# Patient Record
Sex: MALE | Race: BLACK OR AFRICAN AMERICAN | Employment: UNEMPLOYED
[De-identification: names, ages, dates, MRNs, and addresses within clinical notes are randomized per-mention and may not be internally consistent; named-entity substitution may affect disease eponyms.]

---
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
## 2022-05-16 ENCOUNTER — HOSPITAL ENCOUNTER (EMERGENCY)
Age: 22
Discharge: HOME | End: 2022-05-16
Attending: EMERGENCY MEDICINE
Payer: COMMERCIAL

## 2022-05-16 ENCOUNTER — APPOINTMENT (OUTPATIENT)
Dept: GENERAL RADIOLOGY | Age: 22
End: 2022-05-16
Payer: COMMERCIAL

## 2022-05-16 VITALS
TEMPERATURE: 98.2 F | DIASTOLIC BLOOD PRESSURE: 83 MMHG | RESPIRATION RATE: 16 BRPM | OXYGEN SATURATION: 97 % | HEART RATE: 86 BPM | SYSTOLIC BLOOD PRESSURE: 130 MMHG

## 2022-05-16 DIAGNOSIS — R06.02 SHORTNESS OF BREATH: Primary | ICD-10-CM

## 2022-05-16 DIAGNOSIS — J45.20 MILD INTERMITTENT ASTHMA WITHOUT COMPLICATION: ICD-10-CM

## 2022-05-16 PROCEDURE — 99285 EMERGENCY DEPT VISIT HI MDM: CPT

## 2022-05-16 PROCEDURE — 94664 DEMO&/EVAL PT USE INHALER: CPT

## 2022-05-16 PROCEDURE — 6370000000 HC RX 637 (ALT 250 FOR IP): Performed by: STUDENT IN AN ORGANIZED HEALTH CARE EDUCATION/TRAINING PROGRAM

## 2022-05-16 PROCEDURE — 94640 AIRWAY INHALATION TREATMENT: CPT

## 2022-05-16 PROCEDURE — 93005 ELECTROCARDIOGRAM TRACING: CPT

## 2022-05-16 PROCEDURE — U0005 INFEC AGEN DETEC AMPLI PROBE: HCPCS

## 2022-05-16 PROCEDURE — 71046 X-RAY EXAM CHEST 2 VIEWS: CPT

## 2022-05-16 PROCEDURE — U0003 INFECTIOUS AGENT DETECTION BY NUCLEIC ACID (DNA OR RNA); SEVERE ACUTE RESPIRATORY SYNDROME CORONAVIRUS 2 (SARS-COV-2) (CORONAVIRUS DISEASE [COVID-19]), AMPLIFIED PROBE TECHNIQUE, MAKING USE OF HIGH THROUGHPUT TECHNOLOGIES AS DESCRIBED BY CMS-2020-01-R: HCPCS

## 2022-05-16 RX ORDER — IPRATROPIUM BROMIDE AND ALBUTEROL SULFATE 2.5; .5 MG/3ML; MG/3ML
1 SOLUTION RESPIRATORY (INHALATION) EVERY 4 HOURS PRN
Status: DISCONTINUED | OUTPATIENT
Start: 2022-05-16 | End: 2022-05-16 | Stop reason: HOSPADM

## 2022-05-16 RX ORDER — PREDNISONE 20 MG/1
20 TABLET ORAL 2 TIMES DAILY
Qty: 10 TABLET | Refills: 0 | Status: SHIPPED | OUTPATIENT
Start: 2022-05-16 | End: 2022-05-21

## 2022-05-16 RX ORDER — PREDNISONE 20 MG/1
40 TABLET ORAL ONCE
Status: COMPLETED | OUTPATIENT
Start: 2022-05-16 | End: 2022-05-16

## 2022-05-16 RX ORDER — ALBUTEROL SULFATE 90 UG/1
2 AEROSOL, METERED RESPIRATORY (INHALATION) 4 TIMES DAILY PRN
Qty: 54 G | Refills: 0 | Status: SHIPPED | OUTPATIENT
Start: 2022-05-16 | End: 2022-08-25

## 2022-05-16 RX ORDER — ALBUTEROL SULFATE 90 UG/1
2 AEROSOL, METERED RESPIRATORY (INHALATION) EVERY 6 HOURS PRN
Status: DISCONTINUED | OUTPATIENT
Start: 2022-05-16 | End: 2022-05-16 | Stop reason: HOSPADM

## 2022-05-16 RX ADMIN — ALBUTEROL SULFATE 2 PUFF: 90 AEROSOL, METERED RESPIRATORY (INHALATION) at 14:41

## 2022-05-16 RX ADMIN — PREDNISONE 40 MG: 20 TABLET ORAL at 14:38

## 2022-05-16 RX ADMIN — IPRATROPIUM BROMIDE AND ALBUTEROL SULFATE 1 AMPULE: .5; 2.5 SOLUTION RESPIRATORY (INHALATION) at 14:22

## 2022-05-16 ASSESSMENT — ENCOUNTER SYMPTOMS
VOMITING: 0
NAUSEA: 0
SHORTNESS OF BREATH: 1
COUGH: 1
ABDOMINAL PAIN: 0
BACK PAIN: 0

## 2022-05-16 NOTE — Clinical Note
Heladio Hill was seen and treated in our emergency department on 5/16/2022. He may return to work on 05/17/2022. If you have any questions or concerns, please don't hesitate to call.       Constantine Hanks MD

## 2022-05-16 NOTE — ED NOTES
The following labs labeled with pt sticker and tubed to lab:     [] Blue     [] Romana Robins   [] on ice  [] Green/yellow  [] Green/black [] on ice  [] Yellow  [] Red  [] Pink      [x] COVID-19 swab    [] Rapid  [x] PCR  [] Flu swab  [] Peds Viral Panel     [] Urine Sample  [] Pelvic Cultures  [] Blood Cultures          Kiko Paiz LPN  33/69/03 [id number]

## 2022-05-16 NOTE — ED PROVIDER NOTES
[address] [facility]     Emergency Department     Faculty Attestation    I performed a history and physical examination of the patient and discussed management with the resident. I reviewed the residents note and agree with the documented findings including all diagnostic interpretations and plan of care. Any areas of disagreement are noted on the chart. I was personally present for the key portions of any procedures. I have documented in the chart those procedures where I was not present during the key portions. I have reviewed the emergency nurses triage note. I agree with the chief complaint, past medical history, past surgical history, allergies, medications, social and family history as documented unless otherwise noted below. Documentation of the HPI, Physical Exam and Medical Decision Making performed by scriblou is based on my personal performance of the HPI, PE and MDM. For Physician Assistant/ Nurse Practitioner cases/documentation I have personally evaluated this patient and have completed at least one if not all key elements of the E/M (history, physical exam, and MDM). Additional findings are as noted. This patient was evaluated in the Emergency Department for symptoms described in the history of present illness. He/she was evaluated in the context of the global COVID-19 pandemic, which necessitated consideration that the patient might be at risk for infection with the SARS-CoV-2 virus that causes COVID-19. Institutional protocols and algorithms that pertain to the evaluation of patients at risk for COVID-19 are in a state of rapid change based on information released by regulatory bodies including the CDC and federal and state organizations. These policies and algorithms were followed during the patient's care in the ED. Primary Care Physician: No primary care provider on file. History:  This is a 25 y.o. male who presents to the Emergency Department with complaint of shortness of breath. History of asthma. No fevers mild cough. Patient reports that he ran out of his typical inhaler. Physical:     oral temperature is 98.2 °F (36.8 °C). His blood pressure is 130/83 and his pulse is 86. His respiration is 16 and oxygen saturation is 97%.    25 y.o. male acute distress, taking breathing treatments at this time, lungs clear bilaterally, cardiac exam regular rate and rhythm no murmurs rubs gallops, pulmonary clear bilaterally abdomen soft nontender nondistended.     Impression: Asthma exacerbation    Plan: Chest x-ray, COVID swab, aerosols, steroid    EKG Interpretation    Interpreted by me    Rhythm: normal sinus   Rate: normal  Axis: normal  Ectopy: none  Conduction: normal  ST Segments: no acute change  T Waves: no acute change  Q Waves: none    Clinical Impression: no acute changes and normal EKG      Pam Neumann MD, Abiel Scott  Attending Emergency Physician         Wendy Betts MD  05/16/22 [id number]

## 2022-05-16 NOTE — ED PROVIDER NOTES
[facility] ED  Emergency Department Encounter  Emergency Medicine Resident     Pt Name: Ad Patterson  MRN: [id number]  [place] 2000  Date of evaluation: 5/16/22  PCP:  No primary care provider on file. CHIEF COMPLAINT       Chief Complaint   Patient presents with    Shortness of Breath     states hx asthma, out of inhaler, does not have pcp       HISTORY OFPRESENT ILLNESS  (Location/Symptom, Timing/Onset, Context/Setting, Quality, Duration, Modifying Factors,Severity.)      Ad Patterson is a 25 y. o.yo male who presents with shortness of breath with history of asthma. States that he has been feeling intermittent feelings of shortness of breath with intermittent cough. States he ran out of his inhaler has have history of asthma. States he has not noticed wheezing has not noticed any upper respiratory infection symptoms such as rhinorrhea or sore throat. States he feels well is able to run but does feel like he is getting winded more often. Denies abdominal pain nausea or vomiting. Denies headache vision changes or focal deficits. States no to chest pain though he notes that does exist intermittently. States that this time he is completely pain-free. PAST MEDICAL / SURGICAL / SOCIAL / FAMILY HISTORY      has a past medical history of Allergic rhinitis, Allergic rhinitis, and Moderate persistent asthma. has a past surgical history that includes Circumcision.      Social History     Socioeconomic History    Marital status: Single     Spouse name: Not on file    Number of children: Not on file    Years of education: Not on file    Highest education level: Not on file   Occupational History    Not on file   Tobacco Use    Smoking status: Passive Smoke Exposure - Never Smoker    Smokeless tobacco: Never Used    Tobacco comment: dad smokes outside when pt visits   Vaping Use    Vaping Use: Never used   Substance and Sexual Activity    Alcohol use: No    Drug use: No    Sexual activity: Never   Other Topics Concern    Not on file   Social History Narrative    Not on file     Social Determinants of Health     Financial Resource Strain:     Difficulty of Paying Living Expenses: Not on file   Food Insecurity:     Worried About Running Out of Food in the Last Year: Not on file    Leonarda of Food in the Last Year: Not on file   Transportation Needs:     Lack of Transportation (Medical): Not on file    Lack of Transportation (Non-Medical):  Not on file   Physical Activity:     Days of Exercise per Week: Not on file    Minutes of Exercise per Session: Not on file   Stress:     Feeling of Stress : Not on file   Social Connections:     Frequency of Communication with Friends and Family: Not on file    Frequency of Social Gatherings with Friends and Family: Not on file    Attends Protestant Services: Not on file    Active Member of [address] [place] [address] [employer] or Organizations: Not on file    Attends Club or Organization Meetings: Not on file    Marital Status: Not on file   Intimate Partner Violence:     Fear of Current or Ex-Partner: Not on file    Emotionally Abused: Not on file    Physically Abused: Not on file    Sexually Abused: Not on file   Housing Stability:     Unable to Pay for Housing in the Last Year: Not on file    Number of [place] in the Last Year: Not on file    Unstable Housing in the Last Year: Not on file       Family History   Problem Relation Age of Onset    Depression Mother     Miscarriages / [place] Mother     Asthma Maternal Aunt     Asthma Maternal Uncle     High Blood Pressure Maternal Uncle     Heart Disease Maternal Grandmother     High Blood Pressure Maternal Grandmother     High Blood Pressure Maternal Grandfather     Diabetes Paternal Grandmother     Asthma Maternal Aunt     Depression Maternal Aunt     Arthritis Neg Hx     Birth Defects Neg Hx     Cancer Neg Hx     Early Death Neg Hx     Hearing Loss Neg Hx     High Cholesterol Neg Hx     Kidney Disease Neg Hx     Learning Disabilities Neg Hx     Mental Illness Neg Hx     Mental Retardation Neg Hx     Stroke Neg Hx     Substance Abuse Neg Hx     Vision Loss Neg Hx         Allergies:  Patient has no known allergies. Home Medications:  Prior to Admission medications    Medication Sig Start Date End Date Taking? Authorizing Provider   predniSONE (DELTASONE) 20 MG tablet Take 1 tablet by mouth 2 times daily for 5 days 5/16/22 5/21/22 Yes Victoria Bejarano MD   albuterol sulfate HFA (VENTOLIN HFA) 108 (90 Base) MCG/ACT inhaler Inhale 2 puffs into the lungs 4 times daily as needed for Wheezing 5/16/22 8/25/22 Yes Victoria Bejarano MD   ibuprofen (ADVIL;MOTRIN) 600 MG tablet Take 1 tablet by mouth 4 times daily as needed for Pain 8/10/20   Anali Perez MD   montelukast (SINGULAIR) 10 MG tablet Take 1 tablet by mouth nightly  Patient not taking: Reported on 8/10/2020 6/25/18   Kartik Soto MD   fluticasone [facility] [place] [place]) 50 MCG/ACT nasal spray 2 sprays by Nasal route daily  Patient not taking: Reported on 8/10/2020 6/25/18   Kartik Soto MD   fluticasone (FLOVENT HFA) 110 MCG/ACT inhaler Inhale 2 puffs into the lungs 2 times daily 6/25/18 6/25/19  Kartik Soto MD   magnesium oxide (MAG-OX) 400 MG tablet Take 1 tablet by mouth daily  Patient not taking: Reported on 8/10/2020 5/2/17   Susan Dobbs MD   Coenzyme Q-10 100 MG CAPS Take 300 mg by mouth every morning  Patient not taking: Reported on 8/10/2020 5/2/17   Susan Dobbs MD   cyproheptadine (PERIACTIN) 4 MG tablet Take 1 tablet by mouth every evening  Patient not taking: Reported on 8/10/2020 5/2/17   Susan Dobbs MD   ibuprofen (ADVIL;MOTRIN) 200 MG tablet Take 2 tablets by mouth every 8 hours as needed for Pain or Fever    Gerri Hernandez MD   beclomethasone (QVAR) 80 MCG/ACT inhaler Inhale 2 puffs into the lungs 2 times daily.   Patient not taking: Reported on 8/10/2020 4/22/15   Polly Giles APRN - CNP   lansoprazole (PREVACID) 15 MG capsule Take 1 capsule by mouth daily. 4/18/13 4/18/14  Autumn Patel MD   Respiratory Therapy Supplies ([employer] HOLDING CHAMBER/MASK) BARAK by Does not apply route. 2/22/12   Paulina Israel MD       REVIEW OFSYSTEMS    (2-9 systems for level 4, 10 or more for level 5)      Review of Systems   Constitutional: Negative for diaphoresis and fever. HENT: Negative for congestion. Eyes: Negative for visual disturbance. Respiratory: Positive for cough and shortness of breath. Cardiovascular: Negative for chest pain. Gastrointestinal: Negative for abdominal pain, nausea and vomiting. Endocrine: Negative for polyuria. Genitourinary: Negative for dysuria. Musculoskeletal: Negative for back pain. Skin: Negative for wound. Neurological: Negative for headaches. Psychiatric/Behavioral: Negative for confusion. PHYSICAL EXAM   (up to 7 for level 4, 8 or more forlevel 5)      ED TRIAGE VITALS BP: 130/83, Temp: 98.2 °F (36.8 °C), Heart Rate: 86, Resp: 16, SpO2: 97 %    Vitals:    05/16/22 1326   BP: 130/83   Pulse: 86   Resp: 16   Temp: 98.2 °F (36.8 °C)   TempSrc: Oral   SpO2: 97%       Physical Exam  Constitutional:       General: He is not in acute distress. Appearance: He is well-developed. HENT:      Head: Normocephalic and atraumatic. Nose: Nose normal.   Eyes:      Pupils: Pupils are equal, round, and reactive to light. Cardiovascular:      Rate and Rhythm: Normal rate and regular rhythm. Heart sounds: No murmur heard. Pulmonary:      Effort: Pulmonary effort is normal. No respiratory distress. Breath sounds: No stridor. Examination of the right-lower field reveals decreased breath sounds. Examination of the left-lower field reveals decreased breath sounds. Decreased breath sounds present. No wheezing. Abdominal:      General: There is no distension. Palpations: Abdomen is soft. Tenderness:  There is no abdominal tenderness. Musculoskeletal:         General: No tenderness. Normal range of motion. Cervical back: Normal range of motion and neck supple. Skin:     General: Skin is warm and dry. Capillary Refill: Capillary refill takes less than 2 seconds. Findings: No erythema or rash. Neurological:      Mental Status: He is alert and oriented to person, place, and time. Sensory: No sensory deficit. Deep Tendon Reflexes: Reflexes normal.   Psychiatric:         Behavior: Behavior normal.         DIFFERENTIAL  DIAGNOSIS     PLAN (LABS / IMAGING / EKG):  Orders Placed This Encounter   Procedures    XR CHEST (2 VW)    SARS-CoV-2 RT-PCR    Respiratory care evaluation only    EKG 12 Lead       MEDICATIONS ORDERED:  Orders Placed This Encounter   Medications    albuterol sulfate  (90 Base) MCG/ACT inhaler 2 puff     Order Specific Question:   Initiate RT Bronchodilator Protocol     Answer: Yes    ipratropium-albuterol (DUONEB) nebulizer solution 1 ampule     Order Specific Question:   Initiate RT Bronchodilator Protocol     Answer:    Yes    predniSONE (DELTASONE) tablet 40 mg    predniSONE (DELTASONE) 20 MG tablet     Sig: Take 1 tablet by mouth 2 times daily for 5 days     Dispense:  10 tablet     Refill:  0    albuterol sulfate HFA (VENTOLIN HFA) 108 (90 Base) MCG/ACT inhaler     Sig: Inhale 2 puffs into the lungs 4 times daily as needed for Wheezing     Dispense:  54 g     Refill:  0       DDX:     Arrhythmia, asthma exacerbation, pneumonia, COVID-19    Initial MDM/Plan: 25 y.o. male who presents with shortness of breath    Here with intermittent shortness of breath does have history of asthma, mild and intermittent cough  No chest pain on assessment  Diminished bilateral air movement  No wheezing or rhonchi or rales  X-ray negative for pneumonia  COVID-19 PCR ordered  RT evaluation and was given nebulized treatment with albuterol and the airway did open up  Patient feels much better states he is breathing better  Plan for outpatient follow-up  Steroids    Disposition:  Discharged with outpatient follow, strict return precautions, albuterol and steroids        DIAGNOSTIC RESULTS / EMERGENCYDEPARTMENT COURSE / MDM     LABS:  No results found for this visit on 05/16/22. RADIOLOGY:  XR CHEST (2 VW)   Final Result   Negative PA and lateral chest.             EKG  EKG Interpretation    Interpreted by me    Rhythm: normal sinus   Rate: normal  Axis: normal  Ectopy: none  Conduction: normal  ST Segments: no acute change  T Waves: no acute change  Q Waves: none    Clinical Impression: no acute changes and normal EKG    All EKG's are interpreted by the Emergency Department Physicianwho either signs or Co-signs this chart in the absence of a cardiologist.    EMERGENCY DEPARTMENT COURSE:  ED Course as of 05/16/22 1522   Mon May 16, 2022   1406 XR CHEST (2 VW)  Negative   [PS]      ED Course User Index  [PS] Ximena Acosta MD          PROCEDURES:  None    CONSULTS:  None    CRITICAL CARE:  Please see attending note    FINAL IMPRESSION      1. Shortness of breath    2.  Mild intermittent asthma without complication          DISPOSITION / PLAN     DISPOSITION Decision To Discharge 05/16/2022 02:34:54 PM       PATIENT REFERRED TO:  [facility]  [address] [place] [address]  [phone]    As needed, If symptoms worsen    [facility]  [address] [place] [address]  [phone]  In 1 week        DISCHARGE MEDICATIONS:  Discharge Medication List as of 5/16/2022  2:43 PM      START taking these medications    Details   predniSONE (DELTASONE) 20 MG tablet Take 1 tablet by mouth 2 times daily for 5 days, Disp-10 tablet, R-0Print             Ximena Acosta MD  Emergency Medicine Resident    (Please note that portions of this note were completed with a voice recognition program.Efforts were made

## 2022-05-17 LAB
SARS-COV-2: NORMAL
SARS-COV-2: NOT DETECTED
SOURCE: NORMAL

## 2022-05-20 LAB
EKG ATRIAL RATE: 73 BPM
EKG P AXIS: 27 DEGREES
EKG P-R INTERVAL: 148 MS
EKG Q-T INTERVAL: 384 MS
EKG QRS DURATION: 94 MS
EKG QTC CALCULATION (BAZETT): 423 MS
EKG R AXIS: 52 DEGREES
EKG T AXIS: 46 DEGREES
EKG VENTRICULAR RATE: 73 BPM

## 2022-06-11 ENCOUNTER — APPOINTMENT (OUTPATIENT)
Dept: CT IMAGING | Age: 22
End: 2022-06-11
Payer: COMMERCIAL

## 2022-06-11 ENCOUNTER — HOSPITAL ENCOUNTER (EMERGENCY)
Age: 22
Discharge: HOME | End: 2022-06-11
Attending: EMERGENCY MEDICINE
Payer: COMMERCIAL

## 2022-06-11 VITALS
DIASTOLIC BLOOD PRESSURE: 87 MMHG | TEMPERATURE: 98.5 F | WEIGHT: 229 LBS | SYSTOLIC BLOOD PRESSURE: 143 MMHG | OXYGEN SATURATION: 98 % | RESPIRATION RATE: 15 BRPM | HEART RATE: 93 BPM

## 2022-06-11 DIAGNOSIS — S06.0X1A CONCUSSION WITH LOSS OF CONSCIOUSNESS OF 30 MINUTES OR LESS, INITIAL ENCOUNTER: ICD-10-CM

## 2022-06-11 DIAGNOSIS — S09.90XA CLOSED HEAD INJURY, INITIAL ENCOUNTER: Primary | ICD-10-CM

## 2022-06-11 PROCEDURE — 6370000000 HC RX 637 (ALT 250 FOR IP): Performed by: STUDENT IN AN ORGANIZED HEALTH CARE EDUCATION/TRAINING PROGRAM

## 2022-06-11 PROCEDURE — 93005 ELECTROCARDIOGRAM TRACING: CPT | Performed by: STUDENT IN AN ORGANIZED HEALTH CARE EDUCATION/TRAINING PROGRAM

## 2022-06-11 PROCEDURE — 99284 EMERGENCY DEPT VISIT MOD MDM: CPT

## 2022-06-11 PROCEDURE — 70450 CT HEAD/BRAIN W/O DYE: CPT

## 2022-06-11 PROCEDURE — 72125 CT NECK SPINE W/O DYE: CPT

## 2022-06-11 RX ORDER — ACETAMINOPHEN 500 MG
1000 TABLET ORAL ONCE
Status: COMPLETED | OUTPATIENT
Start: 2022-06-11 | End: 2022-06-11

## 2022-06-11 RX ORDER — MECLIZINE HCL 12.5 MG/1
25 TABLET ORAL ONCE
Status: COMPLETED | OUTPATIENT
Start: 2022-06-11 | End: 2022-06-11

## 2022-06-11 RX ADMIN — MECLIZINE 25 MG: 12.5 TABLET ORAL at 20:53

## 2022-06-11 RX ADMIN — ACETAMINOPHEN 1000 MG: 500 TABLET ORAL at 22:32

## 2022-06-11 ASSESSMENT — ENCOUNTER SYMPTOMS
BACK PAIN: 0
SHORTNESS OF BREATH: 0
VOMITING: 0
ABDOMINAL PAIN: 0
NAUSEA: 0

## 2022-06-11 ASSESSMENT — PAIN - FUNCTIONAL ASSESSMENT: PAIN_FUNCTIONAL_ASSESSMENT: NONE - DENIES PAIN

## 2022-06-11 ASSESSMENT — PAIN SCALES - GENERAL: PAINLEVEL_OUTOF10: 8
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
## 2022-06-12 NOTE — ED NOTES
Dr. Aracely Schuster at bedside explained D/C instructions to mom.       Quinton Thacker RN  06/11/22 1561
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
## 2022-06-12 NOTE — ED NOTES
Pt. Resting in cot, RR even and non-labored, NAD noted, family at bedside, call light within reach      Neema Juárez RN  06/11/22 [id number]

## 2022-06-12 NOTE — ED PROVIDER NOTES
[facility] ED  Emergency Department Encounter  Emergency Medicine Resident     Pt Name: Ansley Wood  MRN: [id number]  [place] 2000  Date of evaluation: 6/11/22  PCP:  No primary care provider on file. CHIEF COMPLAINT       Chief Complaint   Patient presents with    Loss of Consciousness     hit head during football        HISTORY OFPRESENT ILLNESS  (Location/Symptom, Timing/Onset, Context/Setting, Quality, Duration, Modifying Factors,Severity.)      Ansley Wood is a 25 y. o.yo male who presents with head injury. Patient here after head injury playing football patient placed tied in position states that he was tackled does not remember exactly what happened he states he lost consciousness felt nauseous after he was able to walk off the field. Denies any other traumatic injuries states he has a headache his body hurts all over. States he cannot remember what initial start of the plate was. Denies shortness of breath. Denies any other history of concussions. Denies focal deficits, changes in vision, abdominal pain, back pain neck pain. States the headache is 5 out of 10 in severity, nonradiating. Patient is not on any anticoagulation. PAST MEDICAL / SURGICAL / SOCIAL / FAMILY HISTORY      has a past medical history of Allergic rhinitis, Allergic rhinitis, and Moderate persistent asthma. has a past surgical history that includes Circumcision.      Social History     Socioeconomic History    Marital status: Single     Spouse name: Not on file    Number of children: Not on file    Years of education: Not on file    Highest education level: Not on file   Occupational History    Not on file   Tobacco Use    Smoking status: Passive Smoke Exposure - Never Smoker    Smokeless tobacco: Never Used    Tobacco comment: dad smokes outside when pt visits   Vaping Use    Vaping Use: Never used   Substance and Sexual Activity    Alcohol use: No    Drug use: No    Sexual activity: Never   Other Topics Concern    Not on file   Social History Narrative    Not on file     Social Determinants of Health     Financial Resource Strain:     Difficulty of Paying Living Expenses: Not on file   Food Insecurity:     Worried About Running Out of Food in the Last Year: Not on file    Leonarda of Food in the Last Year: Not on file   Transportation Needs:     Lack of Transportation (Medical): Not on file    Lack of Transportation (Non-Medical):  Not on file   Physical Activity:     Days of Exercise per Week: Not on file    Minutes of Exercise per Session: Not on file   Stress:     Feeling of Stress : Not on file   Social Connections:     Frequency of Communication with Friends and Family: Not on file    Frequency of Social Gatherings with Friends and Family: Not on file    Attends Scientologist Services: Not on file    Active Member of [address] [place] [address] [employer] or Organizations: Not on file    Attends Club or Organization Meetings: Not on file    Marital Status: Not on file   Intimate Partner Violence:     Fear of Current or Ex-Partner: Not on file    Emotionally Abused: Not on file    Physically Abused: Not on file    Sexually Abused: Not on file   Housing Stability:     Unable to Pay for Housing in the Last Year: Not on file    Number of [place] in the Last Year: Not on file    Unstable Housing in the Last Year: Not on file       Family History   Problem Relation Age of Onset    Depression Mother     Miscarriages / [place] Mother     Asthma Maternal Aunt     Asthma Maternal Uncle     High Blood Pressure Maternal Uncle     Heart Disease Maternal Grandmother     High Blood Pressure Maternal Grandmother     High Blood Pressure Maternal Grandfather     Diabetes Paternal Grandmother     Asthma Maternal Aunt     Depression Maternal Aunt     Arthritis Neg Hx     Birth Defects Neg Hx     Cancer Neg Hx     Early Death Neg Hx     Hearing Loss Neg Hx     High Cholesterol Neg Hx     Kidney Disease Neg Hx     Learning Disabilities Neg Hx     Mental Illness Neg Hx     Mental Retardation Neg Hx     Stroke Neg Hx     Substance Abuse Neg Hx     Vision Loss Neg Hx         Allergies:  Patient has no known allergies. Home Medications:  Prior to Admission medications    Medication Sig Start Date End Date Taking? Authorizing Provider   albuterol sulfate HFA (VENTOLIN HFA) 108 (90 Base) MCG/ACT inhaler Inhale 2 puffs into the lungs 4 times daily as needed for Wheezing 5/16/22 8/25/22  Sabiha Kaufman MD   ibuprofen (ADVIL;MOTRIN) 600 MG tablet Take 1 tablet by mouth 4 times daily as needed for Pain 8/10/20   Anali Perez MD   montelukast (SINGULAIR) 10 MG tablet Take 1 tablet by mouth nightly  Patient not taking: Reported on 8/10/2020 6/25/18   Dang De León MD   fluticasone Dorean Everts) 50 MCG/ACT nasal spray 2 sprays by Nasal route daily  Patient not taking: Reported on 8/10/2020 6/25/18   Dang De León MD   fluticasone (FLOVENT HFA) 110 MCG/ACT inhaler Inhale 2 puffs into the lungs 2 times daily 6/25/18 6/25/19  Dang De León MD   magnesium oxide (MAG-OX) 400 MG tablet Take 1 tablet by mouth daily  Patient not taking: Reported on 8/10/2020 5/2/17   Dev Worley MD   Coenzyme Q-10 100 MG CAPS Take 300 mg by mouth every morning  Patient not taking: Reported on 8/10/2020 5/2/17   Dev Worley MD   cyproheptadine (PERIACTIN) 4 MG tablet Take 1 tablet by mouth every evening  Patient not taking: Reported on 8/10/2020 5/2/17   Dev Worley MD   ibuprofen (ADVIL;MOTRIN) 200 MG tablet Take 2 tablets by mouth every 8 hours as needed for Pain or Fever    Gerri Crowell MD   beclomethasone (QVAR) 80 MCG/ACT inhaler Inhale 2 puffs into the lungs 2 times daily. Patient not taking: Reported on 8/10/2020 4/22/15   KELVIN Ruelas - CNP   lansoprazole (PREVACID) 15 MG capsule Take 1 capsule by mouth daily.  4/18/13 4/18/14  Dang De León MD   Respiratory Therapy Supplies ([employer]AMBER/MASK) BARAK by Does not apply route. 2/22/12   Gilson Palencia MD       REVIEW OFSYSTEMS    (2-9 systems for level 4, 10 or more for level 5)      Review of Systems   Constitutional: Negative for diaphoresis and fever. HENT: Negative for congestion. Eyes: Negative for visual disturbance. Respiratory: Negative for shortness of breath. Cardiovascular: Negative for chest pain. Gastrointestinal: Negative for abdominal pain, nausea and vomiting. Endocrine: Negative for polyuria. Genitourinary: Negative for dysuria. Musculoskeletal: Negative for back pain. Skin: Negative for wound. Neurological: Positive for headaches. Psychiatric/Behavioral: Negative for confusion. PHYSICAL EXAM   (up to 7 for level 4, 8 or more forlevel 5)      ED TRIAGE VITALS BP: (!) 143/87, Temp: 98.5 °F (36.9 °C), Heart Rate: 93, Resp: 15, SpO2: 98 %    Vitals:    06/11/22 2108   BP: (!) 143/87   Pulse: 93   Resp: 15   Temp: 98.5 °F (36.9 °C)   TempSrc: Oral   SpO2: 98%   Weight: 229 lb (103.9 kg)       Physical Exam  Constitutional:       General: He is not in acute distress. Appearance: He is well-developed. HENT:      Head: Normocephalic and atraumatic. Nose: Nose normal.   Eyes:      Pupils: Pupils are equal, round, and reactive to light. Cardiovascular:      Rate and Rhythm: Normal rate and regular rhythm. Heart sounds: No murmur heard. Pulmonary:      Effort: Pulmonary effort is normal. No respiratory distress. Breath sounds: No stridor. No wheezing. Chest:      Chest wall: Tenderness present. Abdominal:      General: There is no distension. Palpations: Abdomen is soft. Tenderness: There is no abdominal tenderness. Musculoskeletal:         General: No tenderness. Normal range of motion. Cervical back: Normal range of motion and neck supple. Skin:     General: Skin is warm and dry.       Capillary Refill: Capillary refill takes less than 2 seconds. Findings: No erythema or rash. Neurological:      Mental Status: He is alert and oriented to person, place, and time. Sensory: No sensory deficit. Deep Tendon Reflexes: Reflexes normal.   Psychiatric:         Behavior: Behavior normal.         DIFFERENTIAL  DIAGNOSIS     PLAN (LABS / IMAGING / EKG):  Orders Placed This Encounter   Procedures    CT Head WO Contrast    CT Cervical Spine WO Contrast    EKG 12 Lead       MEDICATIONS ORDERED:  Orders Placed This Encounter   Medications    meclizine (ANTIVERT) tablet 25 mg    acetaminophen (TYLENOL) tablet 1,000 mg       DDX:     Concussion versus traumatic intracranial process    Initial MDM/Plan: 25 y.o. male who presents with head injury    Here after head injury in football or loss of conscious no anticoagulation  Here with headache  No neurodeficits, neuro exam unremarkable  Did have chest tenderness on neuro exam, EKG without ST segment elevations  Ultrasound shows lung sliding bilaterally, heart that is free from cardiomyopathy  CT head negative for acute intracranial abnormality  CT neck does not show any traumatic malalignment  Discussed concussion  Discussed outpatient follow-up with neurology  Discussed no contact sports until seen by neurology and cleared  Discussed return precautions  Tylenol for pain    Disposition:  Discharged with outpatient follow-up      DIAGNOSTIC RESULTS / EMERGENCYDEPARTMENT COURSE / MDM     LABS:  No results found for this visit on 06/11/22. RADIOLOGY:  CT Head WO Contrast   Final Result   No acute intracranial abnormality. CT Cervical Spine WO Contrast   Final Result   No acute fracture or traumatic malalignment. Slight reversal of the normal cervical lordosis may be secondary to   positioning or muscle spasm.              EKG  No ST segment elevation depression seen on EKG    All EKG's are interpreted by the Emergency Department Physicianwho either signs or Co-signs this chart in the absence of a cardiologist.    EMERGENCY DEPARTMENT COURSE:  ED Course as of 06/11/22 2257   Sat Jun 11, 2022   2144 Bedside echo negative for cardiomegaly  [PS]   2215 CT Head WO Contrast  Negative  [PS]      ED Course User Index  [PS] Sabiha Kaufman MD          PROCEDURES:  None    CONSULTS:  None    CRITICAL CARE:  Please see attending note    FINAL IMPRESSION      1. Closed head injury, initial encounter    2.  Concussion with loss of consciousness of 30 minutes or less, initial encounter          DISPOSITION / PLAN     DISPOSITION Decision To Discharge 06/11/2022 10:16:41 PM       PATIENT REFERRED TO:  [facility]  [address] [place] [address]  [phone]    As needed, If symptoms worsen    Marry Corrales MD  [address]  [phone]    In 1 week        DISCHARGE MEDICATIONS:  Discharge Medication List as of 6/11/2022 10:23 PM          Sabiha Kaufman MD  Emergency Medicine Resident    (Please note that portions of this note were completed with a voice recognition program.Efforts were made to edit the dictations but occasionally words are mis-transcribed.)       Sabiha Kaufman MD  Resident  06/11/22 2212 Bia Edouard MD  Resident  06/11/22 3614

## 2022-06-12 NOTE — ED NOTES
Pt inf of need to come in and get labs completed we will check on again next week    Pt. To Ct via raissa Mendoza, [facility]  06/11/22 2470

## 2022-06-13 LAB
EKG ATRIAL RATE: 100 BPM
EKG P AXIS: 42 DEGREES
EKG P-R INTERVAL: 176 MS
EKG Q-T INTERVAL: 340 MS
EKG QRS DURATION: 94 MS
EKG QTC CALCULATION (BAZETT): 438 MS
EKG R AXIS: 21 DEGREES
EKG T AXIS: 13 DEGREES
EKG VENTRICULAR RATE: 100 BPM

## 2022-06-13 PROCEDURE — 93010 ELECTROCARDIOGRAM REPORT: CPT | Performed by: INTERNAL MEDICINE

## 2024-07-06 ENCOUNTER — HOSPITAL ENCOUNTER (EMERGENCY)
Age: 24
Discharge: HOME | End: 2024-07-06
Attending: STUDENT IN AN ORGANIZED HEALTH CARE EDUCATION/TRAINING PROGRAM
Payer: COMMERCIAL

## 2024-07-06 ENCOUNTER — APPOINTMENT (OUTPATIENT)
Dept: GENERAL RADIOLOGY | Age: 24
End: 2024-07-06
Attending: STUDENT IN AN ORGANIZED HEALTH CARE EDUCATION/TRAINING PROGRAM
Payer: COMMERCIAL

## 2024-07-06 VITALS
RESPIRATION RATE: 15 BRPM | DIASTOLIC BLOOD PRESSURE: 79 MMHG | TEMPERATURE: 97.6 F | HEIGHT: 75 IN | HEART RATE: 86 BPM | BODY MASS INDEX: 29.84 KG/M2 | SYSTOLIC BLOOD PRESSURE: 116 MMHG | WEIGHT: 240 LBS | OXYGEN SATURATION: 99 %

## 2024-07-06 DIAGNOSIS — R09.1 PLEURISY: Primary | ICD-10-CM

## 2024-07-06 LAB
EKG ATRIAL RATE: 96 BPM
EKG P AXIS: 70 DEGREES
EKG P-R INTERVAL: 174 MS
EKG Q-T INTERVAL: 332 MS
EKG QRS DURATION: 90 MS
EKG QTC CALCULATION (BAZETT): 419 MS
EKG R AXIS: 80 DEGREES
EKG T AXIS: 52 DEGREES
EKG VENTRICULAR RATE: 96 BPM

## 2024-07-06 PROCEDURE — 99284 EMERGENCY DEPT VISIT MOD MDM: CPT

## 2024-07-06 PROCEDURE — 6370000000 HC RX 637 (ALT 250 FOR IP): Performed by: STUDENT IN AN ORGANIZED HEALTH CARE EDUCATION/TRAINING PROGRAM

## 2024-07-06 PROCEDURE — 71046 X-RAY EXAM CHEST 2 VIEWS: CPT

## 2024-07-06 PROCEDURE — 93005 ELECTROCARDIOGRAM TRACING: CPT | Performed by: STUDENT IN AN ORGANIZED HEALTH CARE EDUCATION/TRAINING PROGRAM

## 2024-07-06 RX ORDER — NAPROXEN 250 MG/1
500 TABLET ORAL ONCE
Status: COMPLETED | OUTPATIENT
Start: 2024-07-06 | End: 2024-07-06

## 2024-07-06 RX ORDER — NAPROXEN 250 MG/1
500 TABLET ORAL 2 TIMES DAILY WITH MEALS
Qty: 56 TABLET | Refills: 0 | Status: SHIPPED | OUTPATIENT
Start: 2024-07-06 | End: 2024-07-20

## 2024-07-06 RX ORDER — ALBUTEROL SULFATE 90 UG/1
2 AEROSOL, METERED RESPIRATORY (INHALATION) 4 TIMES DAILY PRN
Qty: 54 G | Refills: 0 | Status: SHIPPED | OUTPATIENT
Start: 2024-07-06 | End: 2024-10-15

## 2024-07-06 RX ADMIN — NAPROXEN 500 MG: 250 TABLET ORAL at 08:43

## 2024-07-06 ASSESSMENT — PAIN - FUNCTIONAL ASSESSMENT: PAIN_FUNCTIONAL_ASSESSMENT: ACTIVITIES ARE NOT PREVENTED

## 2024-07-06 ASSESSMENT — ENCOUNTER SYMPTOMS
NAUSEA: 0
ABDOMINAL PAIN: 0
RHINORRHEA: 0
COUGH: 0
CHEST TIGHTNESS: 1
SHORTNESS OF BREATH: 0
VOMITING: 0

## 2024-07-06 ASSESSMENT — PAIN DESCRIPTION - ORIENTATION: ORIENTATION: MID;UPPER

## 2024-07-06 ASSESSMENT — PAIN DESCRIPTION - LOCATION: LOCATION: CHEST

## 2024-07-06 ASSESSMENT — PAIN SCALES - GENERAL: PAINLEVEL_OUTOF10: 5

## 2024-07-06 ASSESSMENT — LIFESTYLE VARIABLES
HOW MANY STANDARD DRINKS CONTAINING ALCOHOL DO YOU HAVE ON A TYPICAL DAY: PATIENT DOES NOT DRINK
HOW OFTEN DO YOU HAVE A DRINK CONTAINING ALCOHOL: NEVER

## 2024-07-06 ASSESSMENT — PAIN DESCRIPTION - DESCRIPTORS: DESCRIPTORS: HEAVINESS

## 2024-07-06 NOTE — ED TRIAGE NOTES
Mode of arrival (squad #, walk in, police, etc) : walk in        Chief complaint(s): Chest Pain        Arrival Note (brief scenario, treatment PTA, etc).: Patient reports pain with deep breath in, started a couple days ago. Patient does have a hx of asthma but is not currently on medications for it.         C= \"Have you ever felt that you should Cut down on your drinking?\"  No  A= \"Have people Annoyed you by criticizing your drinking?\"  No  G= \"Have you ever felt bad or Guilty about your drinking?\"  No  E= \"Have you ever had a drink as an Eye-opener first thing in the morning to steady your nerves or to help a hangover?\"  No      Deferred []      Reason for deferring: N/A    *If yes to two or more: probable alcohol abuse.*

## 2024-07-06 NOTE — ED PROVIDER NOTES
[facility]  Emergency Department Encounter  Emergency Medicine Physician     Pt Name: Say Lobo  MRN: [id number]  Birthdate 2000  Date of evaluation: 7/6/24  PCP:  No primary care provider on file.    CHIEF COMPLAINT       Chief Complaint   Patient presents with    Chest Pain       HISTORY OF PRESENT ILLNESS  (Location/Symptom, Timing/Onset, Context/Setting, Quality, Duration, Modifying Factors, Severity.)    Say Lobo is a 23 y.o. male who presents with centralized chest pain.  Ongoing for the past day or 2.  States he whenever he takes a deep breath is bothersome to him.  Denies any recent illness, or cough or chills or fever or nausea or vomiting.  States he does have a history of asthma.  States that sometimes he gets like this when the weather changes..  States he has not had any difficulty breathing.  He has not had any access to an inhaler in several years.  Has not used an inhaler or steroids recently either.        PAST MEDICAL / SURGICAL / SOCIAL / FAMILY HISTORY    has a past medical history of Allergic rhinitis, Allergic rhinitis, and Moderate persistent asthma.     has a past surgical history that includes Circumcision.    Social History     Socioeconomic History    Marital status: Single     Spouse name: Not on file    Number of children: Not on file    Years of education: Not on file    Highest education level: Not on file   Occupational History    Not on file   Tobacco Use    Smoking status: Passive Smoke Exposure - Never Smoker    Smokeless tobacco: Never    Tobacco comments:     dad smokes outside when pt visits   Vaping Use    Vaping Use: Never used   Substance and Sexual Activity    Alcohol use: No    Drug use: No    Sexual activity: Never   Other Topics Concern    Not on file   Social History Narrative    Not on file     Social Determinants of Health     Financial Resource Strain: Not on file   Food Insecurity: Not on file   Transportation Needs: Not on file  Physician whoeither signs or Co-signs this chart in the absence of a cardiologist.    Impression:  1)  Number and Complexity of Problems    Problem List, DDX, Considered diagnosis:  Patient originally presented with a concern for possible asthma however his lungs are clear to auscultation bilaterally.  He has no wheezing at all.  There is no stridor.  Vital signs are stable.  Denies any recent illness.  For what he is describing with pain with deep breath, sounds more like pleurisy to me.  Possible pneumonia, will get a chest x-ray.  Also likely but considered for pericarditis, which EKG EKG did POCUS exam.      Pertinent Comorbid Conditions:  See history above.        EMERGENCY DEPARTMENT COURSE:  MDM:    2)  Data Reviewed    Decision Rules, Testing considered, external document review, independent imaging review:  History of asthma.  Does not follow PCP      Diagnoses Considered but Do Not Suspect: aortic dissection, ACS, asthma exacerbation         3)  Treatment and Disposition          Patient repeat assessment, shared decision making, and disposition discussion:  Feeling better after medication  POCUS negative for pericardial effusion  EKG without signs of pericarditis  CXR negative  Plan for dc and treatment of pleurisy      I have reviewed discharge instructions with the patient.  The patient verbalized understanding.      MIPS:  [None]    Social determinants of health impacting treatment or disposition:  none    Code Status Discussion:  Did not have Code status discussion since patient being discharged      PROCEDURES:  CARDIAC ULTRASOUND:     A limited, bedside ultrasound of the heart was performed.  The medical necessity was to evaluate for a ejection fraction, IVC assessment, pericardial effusion.  The structures studied were the heart and pericardium.    FINDINGS:  Ejection Fraction: Greater than 50%  IVC Assessment: >50% collapsibility   Pericardial effusion was not identified.  The study was

## 2024-07-06 NOTE — DISCHARGE INSTRUCTIONS
Please follow-up with a family doctor  Please take the naproxen as prescribed  You may use your inhaler as prescribed as needed  And return to the ER with any severe worsening of symptoms that you cannot control at home

## 2024-07-09 PROCEDURE — 93010 ELECTROCARDIOGRAM REPORT: CPT | Performed by: INTERNAL MEDICINE
